# Patient Record
Sex: MALE
[De-identification: names, ages, dates, MRNs, and addresses within clinical notes are randomized per-mention and may not be internally consistent; named-entity substitution may affect disease eponyms.]

---

## 2021-02-19 ENCOUNTER — NURSE TRIAGE (OUTPATIENT)
Dept: OTHER | Facility: CLINIC | Age: 76
End: 2021-02-19

## 2021-02-19 NOTE — TELEPHONE ENCOUNTER
Reason for Disposition   Mild localized rash    Answer Assessment - Initial Assessment Questions  1. APPEARANCE of RASH: \"Describe the rash. \"       The largest spot looks like a blister. The rest looks more like scratch marks    2. LOCATION: \"Where is the rash located? \"     Left side of torso     3. NUMBER: \"How many spots are there? \"     Multiple spots    4. SIZE: \"How big are the spots? \" (Inches, centimeters or compare to size of a coin)         5. ONSET: \"When did the rash start? \"    started 3 days ago    6. ITCHING: \"Does the rash itch? \" If so, ask: \"How bad is the itch? \"  (Scale 1-10; or mild, moderate, severe)     Itching constantly    7. PAIN: \"Does the rash hurt? \" If so, ask: \"How bad is the pain? \"  (Scale 1-10; or mild, moderate, severe)   no pain    8. OTHER SYMPTOMS: \"Do you have any other symptoms? \" (e.g., fever)   no other symptoms    9. PREGNANCY: \"Is there any chance you are pregnant? \" \"When was your last menstrual period? \"      migue    Protocols used: RASH OR REDNESS - LOCALIZED-ADULT-[place]  Brief description of triage: Rash on left side of torso that started 3 days ago. Looks like scratch marks. 2 spots look like blisters. Is very itchy, no pain, no fever, no other symptoms. Has been using Neosporin. Triage indicates for patient to home care. Care advice provided, patient verbalizes understanding; denies any other questions or concerns; instructed to call back for any new or worsening symptoms. This triage is a result of a call to [address] [place] [address]. Please do not respond to the triage nurse through this encounter. Any subsequent communication should be directly with the patient.

## 2023-03-26 ENCOUNTER — NURSE TRIAGE (OUTPATIENT)
Dept: OTHER | Facility: CLINIC | Age: 78
End: 2023-03-26

## 2023-03-27 NOTE — TELEPHONE ENCOUNTER
Location of patient: [place]    Subjective: Caller states \"He has a catheter. Tonight the urine is leaking out around the penis rather than it going into the tube. Urine was clear earlier in the day but now is raspberry. \"     Current Symptoms: raspberry colored urine, leaking around catheter    Onset: 1 hours ago    Associated Symptoms: NA    Pain Severity: unknown    Temperature: NA    What has been tried: NA    LMP: NA Pregnant: NA    Recommended disposition: Go to ED Now    Care advice provided, patient verbalizes understanding; denies any other questions or concerns; instructed to call back for any new or worsening symptoms. Patient/caller agrees to proceed to local Emergency Department    This triage is a result of a call to [address] [place] [address]. Please do not respond to the triage nurse through this encounter. Any subsequent communication should be directly with the patient.     Reason for Disposition   Bleeding around catheter (e.g., from penis or female urethra)    Protocols used: Urinary Catheter (e.g., [facility]) Symptoms and Questions-ADULT-

## 2023-10-26 ENCOUNTER — APPOINTMENT (OUTPATIENT)
Dept: PULMONOLOGY | Facility: CLINIC | Age: 78
End: 2023-10-26
Payer: MEDICARE

## 2023-11-30 PROBLEM — R91.8 LUNG NODULES: Status: ACTIVE | Noted: 2023-11-30

## 2023-11-30 PROBLEM — H52.7 REFRACTIVE ERROR: Status: ACTIVE | Noted: 2023-11-30

## 2023-11-30 PROBLEM — B35.1 ONYCHOMYCOSIS: Status: ACTIVE | Noted: 2023-11-30

## 2023-11-30 PROBLEM — I10 HYPERTENSION: Status: ACTIVE | Noted: 2023-11-30

## 2023-11-30 PROBLEM — H25.813 MIXED TYPE AGE-RELATED CATARACT, BOTH EYES: Status: ACTIVE | Noted: 2023-11-30

## 2023-11-30 PROBLEM — G47.33 OBSTRUCTIVE SLEEP APNEA, ADULT: Status: ACTIVE | Noted: 2023-11-30

## 2023-11-30 PROBLEM — J31.0 MIXED RHINITIS: Status: ACTIVE | Noted: 2023-11-30

## 2023-11-30 PROBLEM — D64.9 ANEMIA: Status: ACTIVE | Noted: 2023-11-30

## 2023-11-30 PROBLEM — N39.0 ACUTE LOWER URINARY TRACT INFECTION: Status: ACTIVE | Noted: 2023-11-30

## 2023-11-30 PROBLEM — E11.319 DIABETIC RETINOPATHY (MULTI): Status: ACTIVE | Noted: 2023-11-30

## 2023-11-30 PROBLEM — A41.9 SEPSIS (MULTI): Status: ACTIVE | Noted: 2023-11-30

## 2023-11-30 PROBLEM — H02.839 DERMATOCHALASIS: Status: ACTIVE | Noted: 2023-11-30

## 2023-11-30 PROBLEM — J44.9 CHRONIC OBSTRUCTIVE LUNG DISEASE (MULTI): Status: ACTIVE | Noted: 2023-11-30

## 2023-11-30 PROBLEM — R31.0 FRANK HEMATURIA: Status: ACTIVE | Noted: 2023-11-30

## 2023-11-30 PROBLEM — K22.719 BARRETT'S ESOPHAGUS WITH DYSPLASIA: Status: ACTIVE | Noted: 2023-11-30

## 2023-11-30 PROBLEM — I10 BENIGN ESSENTIAL HYPERTENSION: Status: ACTIVE | Noted: 2023-11-30

## 2023-11-30 PROBLEM — M54.10 RADICULITIS OF LEG: Status: ACTIVE | Noted: 2023-11-30

## 2023-11-30 PROBLEM — R10.31 RIGHT LOWER QUADRANT ABDOMINAL PAIN: Status: ACTIVE | Noted: 2023-11-30

## 2023-11-30 PROBLEM — T17.500A MUCUS PLUGGING OF BRONCHI: Status: ACTIVE | Noted: 2023-11-30

## 2023-11-30 PROBLEM — E66.9 DIABETES MELLITUS TYPE 2 IN OBESE: Status: ACTIVE | Noted: 2023-11-30

## 2023-11-30 PROBLEM — L84 PRE-ULCERATIVE CALLUSES: Status: ACTIVE | Noted: 2023-11-30

## 2023-11-30 PROBLEM — H90.3 SENSORINEURAL HEARING LOSS, BILATERAL: Status: ACTIVE | Noted: 2023-11-30

## 2023-11-30 PROBLEM — R80.9 MICROALBUMINURIA: Status: ACTIVE | Noted: 2023-11-30

## 2023-11-30 PROBLEM — M20.41 HAMMER TOES, BILATERAL: Status: ACTIVE | Noted: 2023-11-30

## 2023-11-30 PROBLEM — H43.399 VITREOUS FLOATERS: Status: ACTIVE | Noted: 2023-11-30

## 2023-11-30 PROBLEM — H26.9 CATARACTS, BOTH EYES: Status: ACTIVE | Noted: 2023-11-30

## 2023-11-30 PROBLEM — R60.9 EDEMA: Status: ACTIVE | Noted: 2023-11-30

## 2023-11-30 PROBLEM — N40.0 BPH (BENIGN PROSTATIC HYPERPLASIA): Status: ACTIVE | Noted: 2023-11-30

## 2023-11-30 PROBLEM — H53.8 BLURRED VISION, BILATERAL: Status: ACTIVE | Noted: 2023-11-30

## 2023-11-30 PROBLEM — G47.30 SLEEP APNEA: Status: ACTIVE | Noted: 2023-11-30

## 2023-11-30 PROBLEM — E11.69 DIABETES MELLITUS TYPE 2 IN OBESE: Status: ACTIVE | Noted: 2023-11-30

## 2023-11-30 PROBLEM — L91.8 INFLAMED SKIN TAG: Status: ACTIVE | Noted: 2023-11-30

## 2023-11-30 PROBLEM — H25.9 AGE RELATED CATARACT: Status: ACTIVE | Noted: 2023-11-30

## 2023-11-30 PROBLEM — J44.9 COPD (CHRONIC OBSTRUCTIVE PULMONARY DISEASE) (MULTI): Status: ACTIVE | Noted: 2023-11-30

## 2023-11-30 PROBLEM — E11.9 DIABETES MELLITUS (MULTI): Status: ACTIVE | Noted: 2023-11-30

## 2023-11-30 PROBLEM — J30.9 ALLERGIC RHINITIS: Status: ACTIVE | Noted: 2023-11-30

## 2023-11-30 PROBLEM — N28.9 DECREASED RENAL FUNCTION: Status: ACTIVE | Noted: 2023-11-30

## 2023-11-30 PROBLEM — S39.012A STRAIN OF LUMBAR REGION: Status: ACTIVE | Noted: 2023-11-30

## 2023-11-30 PROBLEM — M79.10 MYALGIA: Status: ACTIVE | Noted: 2023-11-30

## 2023-11-30 PROBLEM — H25.13 NUCLEAR SCLEROSIS OF BOTH EYES: Status: ACTIVE | Noted: 2023-11-30

## 2023-11-30 PROBLEM — M79.675 PAIN IN TOES OF BOTH FEET: Status: ACTIVE | Noted: 2023-11-30

## 2023-11-30 PROBLEM — K43.2 INCISIONAL HERNIA: Status: ACTIVE | Noted: 2023-11-30

## 2023-11-30 PROBLEM — E10.9 CONTROLLED DIABETES MELLITUS TYPE 1 WITHOUT COMPLICATIONS (MULTI): Status: ACTIVE | Noted: 2023-11-30

## 2023-11-30 PROBLEM — H40.009 GLAUCOMA SUSPECT: Status: ACTIVE | Noted: 2023-11-30

## 2023-11-30 PROBLEM — E11.9 DM W/O COMPLICATION TYPE II (MULTI): Status: ACTIVE | Noted: 2023-11-30

## 2023-11-30 PROBLEM — M20.42 HAMMER TOES, BILATERAL: Status: ACTIVE | Noted: 2023-11-30

## 2023-11-30 PROBLEM — R31.9 HEMATURIA SYNDROME: Status: ACTIVE | Noted: 2023-11-30

## 2023-11-30 PROBLEM — N64.4 BREAST PAIN, LEFT: Status: ACTIVE | Noted: 2023-11-30

## 2023-11-30 PROBLEM — M79.674 PAIN IN TOES OF BOTH FEET: Status: ACTIVE | Noted: 2023-11-30

## 2023-11-30 PROBLEM — Z97.3 WEARS EYEGLASSES: Status: ACTIVE | Noted: 2023-11-30

## 2023-11-30 PROBLEM — E78.2 MIXED HYPERLIPIDEMIA: Status: ACTIVE | Noted: 2023-11-30

## 2023-11-30 PROBLEM — S61.219A LACERATION OF FINGER: Status: ACTIVE | Noted: 2023-11-30

## 2023-11-30 PROBLEM — H02.9 EYELID LESION, BENIGN: Status: ACTIVE | Noted: 2023-11-30

## 2023-11-30 PROBLEM — R06.02 EXERTIONAL SHORTNESS OF BREATH: Status: ACTIVE | Noted: 2023-11-30

## 2023-11-30 PROBLEM — H61.23 BILATERAL IMPACTED CERUMEN: Status: ACTIVE | Noted: 2023-11-30

## 2023-11-30 PROBLEM — H26.9 CATARACT: Status: ACTIVE | Noted: 2023-11-30

## 2023-11-30 RX ORDER — LOSARTAN POTASSIUM AND HYDROCHLOROTHIAZIDE 25; 100 MG/1; MG/1
TABLET ORAL EVERY 24 HOURS
COMMUNITY
Start: 2014-02-28 | End: 2023-12-14 | Stop reason: ALTCHOICE

## 2023-11-30 RX ORDER — FLUTICASONE PROPIONATE 50 MCG
2 SPRAY, SUSPENSION (ML) NASAL DAILY
COMMUNITY
Start: 2022-10-20

## 2023-11-30 RX ORDER — INSULIN GLARGINE 100 [IU]/ML
INJECTION, SOLUTION SUBCUTANEOUS
COMMUNITY

## 2023-11-30 RX ORDER — ALBUTEROL SULFATE 90 UG/1
AEROSOL, METERED RESPIRATORY (INHALATION)
COMMUNITY
Start: 2022-10-20 | End: 2024-02-22 | Stop reason: SDUPTHER

## 2023-11-30 RX ORDER — LIRAGLUTIDE 6 MG/ML
INJECTION SUBCUTANEOUS EVERY 24 HOURS
COMMUNITY
Start: 2014-02-28

## 2023-11-30 RX ORDER — PRAVASTATIN SODIUM 20 MG/1
TABLET ORAL EVERY 24 HOURS
COMMUNITY

## 2023-11-30 RX ORDER — PIOGLITAZONEHYDROCHLORIDE 45 MG/1
TABLET ORAL EVERY 24 HOURS
COMMUNITY

## 2023-12-14 ENCOUNTER — OFFICE VISIT (OUTPATIENT)
Dept: PULMONOLOGY | Facility: CLINIC | Age: 78
End: 2023-12-14
Payer: MEDICARE

## 2023-12-14 VITALS
BODY MASS INDEX: 42 KG/M2 | DIASTOLIC BLOOD PRESSURE: 76 MMHG | OXYGEN SATURATION: 95 % | SYSTOLIC BLOOD PRESSURE: 134 MMHG | WEIGHT: 276.2 LBS | HEART RATE: 68 BPM

## 2023-12-14 DIAGNOSIS — J43.2 CENTRILOBULAR EMPHYSEMA (MULTI): ICD-10-CM

## 2023-12-14 DIAGNOSIS — G47.33 OBSTRUCTIVE SLEEP APNEA, ADULT: Primary | ICD-10-CM

## 2023-12-14 DIAGNOSIS — R91.8 LUNG NODULES: ICD-10-CM

## 2023-12-14 DIAGNOSIS — T17.500A MUCUS PLUGGING OF BRONCHI: ICD-10-CM

## 2023-12-14 PROCEDURE — 1159F MED LIST DOCD IN RCRD: CPT | Performed by: NURSE PRACTITIONER

## 2023-12-14 PROCEDURE — 1036F TOBACCO NON-USER: CPT | Performed by: NURSE PRACTITIONER

## 2023-12-14 PROCEDURE — 1160F RVW MEDS BY RX/DR IN RCRD: CPT | Performed by: NURSE PRACTITIONER

## 2023-12-14 PROCEDURE — 3075F SYST BP GE 130 - 139MM HG: CPT | Performed by: NURSE PRACTITIONER

## 2023-12-14 PROCEDURE — 99214 OFFICE O/P EST MOD 30 MIN: CPT | Performed by: NURSE PRACTITIONER

## 2023-12-14 PROCEDURE — 3078F DIAST BP <80 MM HG: CPT | Performed by: NURSE PRACTITIONER

## 2023-12-14 NOTE — PATIENT INSTRUCTIONS
Mr. Amaya it was a pleasure seeing you in the office today. We discussed the following:      - Continue your proair as needed, and Spiriva handihaler every day    -You can use the Wixela in the morning and at night and your Spiriva during the day   - Great job with your CPAP, continue the good work does try and sleep little longer   - Please continue to stay active     Follow-up in 2 months with C Pap machine, mask and supplies.

## 2023-12-14 NOTE — PROGRESS NOTES
Subjective   Patient ID: sIidoro Amaya is a 78 y.o. male who presents for No chief complaint on file..  HPI  11/19/2019: 74-year-old  man with severe obstructive sleep apnea (AHI 53.9, 12/8/15) on C Pap 14 with DreamWear FFM M/M, moderate COPD, and multiple calcified mucous plugs presents for routine follow-up. The patient has been using see Pap 5-6 hours every night. No snoring on C Pap. Feels rested with sleep and denies excessive daytime sleepiness with an [place] sleepiness score of 8/24. He has been compliant with C Pap based on download from his machine. However there is and excessive mask leak recorded at 95 L/m. I examined his mask and noted that it would leak when he lies on his side despite tightening the straps. Short of breath only when walking fast. Occasional cough. No wheezing. Not always compliant with Spiriva, only using it 3-4 times a week. Rarely takes Pro Air. No hospitalizations or ER visits for respiratory problems in one year.     08/20/2020: Patient is here today to establish care. He is doing well, uses his Spiriva daily. Rarely uses his rescue inhaler. Really only experiences shortness of breath in the heat and humidity. He does get his Brie via from [employer]. He is 100% compliant with his CPAP using it 6 hours a night AHI is 2.6. He feels rested with sleep. He does get supplies regularly from [facility]. He has had s  ome billing issues with [employer]. His wife is working out. No ER visits or hospitalizations for respiratory problems.    03/04/2021: He is here today for follow-up. Patient is doing great with his CPAP machine he uses it every night for 6 hours AHI is 4. He feels rested with sleep. He gets supplies regularly. He uses his Spiriva every day he still gets this from [employer]. He does have a cough with some loose congestion although when it is productive it is clear he denies fevers, chills. I suggested he try some Mucinex to help bring the cough up and thin out the mucus. He does use  his Acapella device daily. He has a history of mucus plugging.    09/09/2021: He is here today for follow-up. He has been feeling well he uses his Spiriva daily. Has not needed his rescue medication. Had a little trouble breathing when it was hot and humid but now that it is cooler he is doing much better. He does use his CPAP every night for about 6 hours. He gets supplies regularly. Feels rested with sleep. He cannot sleep without his CPAP. I encouraged him to stay active.    04/07/2022: He is here today for follow-up. He is doing well. He does have a little bit of a morning cough which sounds like postnasal drip. We talked about using some Claritin or Mucinex or even a nasal spray if he prefers. He does use his Spiriva although not every day. I gave him some samples of the Spiriva Respimat. He does get the Spiriva which is a capsule through [employer]. Has not needed his rescue inhaler at all. Uses his CPAP every night for 5 hours AHI is 3. Get supplies regularly. Feels rested with sleep.    10/20/22 he is here today for follow-up. He has had a postnasal drip congested cough for the last week or 2 since the weather changed. Encouraged him to use his Claritin his guaifenesin and I will add some Flonase. Currently he is not using any inhalers. [facility] is out of Spiriva so he has not been able to get it. I did offer to send him in something else but he wants to wait. And he needs his ProAir refilled. He still uses his Acapella device and I encouraged him to continue to do that. He uses his CPAP every night for 5 hours his AHI is 2.3. He feels rested with sleep. He says 5 hours is his average every once while he will sleep 6.    04/20/23 he is here today for follow-up. He is doing well with his Spiriva. He gets it from [employer]. He has the HandiHaler. Uses his albuterol as needed. He did have bronchitis a couple weeks ago he was treated with doxycycline. He is using his CPAP every night for about 4 hours. He says he does  not always sleep that long because he has had a lot of medical issues over the last couple of months. I encouraged him to sleep longer or use his CPAP if he naps during the day 10 minutes spent preparing patient's chart. 20 minutes spent with patient answering questions and determining care. 5 minutes spent charting and ordering tests and medications     12/14/23 he is here today for follow-up.  He continues to use his Spiriva from [facility].  His PCP added Wixela which I explained to him today he can use the Wixela with the Spiriva and it makes triple therapy.  He is going to try it and see how it is working for him.  He is also never used his rescue inhaler he really felt that you just used it when it was a dire emergency.  We discussed utilizing his inhaler for shortness of breath coughing and wheezing as well as pretreating for something that he knows is going to cause some shortness of breath such as walking upstairs.  He wears the his CPAP machine every night.  He is averages 3.6 hours we talked today about how he needs to utilize it 70% of the time for 4 hours or greater.  He is going to work on this and come back in 2 months  Review of Systems   All other systems reviewed and are negative.      Objective   Physical Exam  Vitals and nursing note reviewed.   Constitutional:       Appearance: Normal appearance. He is normal weight.   HENT:      Head: Normocephalic.      Nose: Nose normal.   Eyes:      Pupils: Pupils are equal, round, and reactive to light.   Cardiovascular:      Rate and Rhythm: Normal rate.   Pulmonary:      Effort: Pulmonary effort is normal.      Breath sounds: Rhonchi present.   Neurological:      General: No focal deficit present.      Mental Status: He is alert and oriented to person, place, and time. Mental status is at baseline.   Psychiatric:         Mood and Affect: Mood normal.         Behavior: Behavior normal.         Thought Content: Thought content normal.         Assessment/Plan      # . Severe obstructive sleep apnea with good compliance and good control of sleep symptoms    - Continue with C Pap 13 and you ResMed air fit F20 medium fullface mask with headgear for at least 4 hours every night with sleep.    - Great job with your CPAP, he uses it every night for 6 hours and gets supplies regularly   04/20/23 he is using his CPAP for about 4 hours a night he said he has had a lot of medical problems over the last couple of months and sometimes he just does not sleep well  12/14/23 he is using his machine every night. Will increase sleep usage by sleeping longer     #. Moderate COPD from secondhand smoke exposure, controlled but not always compliant with Spiriva (3-4 days)   - Advised to take Spiriva regularly one puff daily. Continue with Pro Air 4 times daily followed by an Acapella device to help expectorate mucous plugs. Pro Air may also be taken as needed for shortness of breath   - having some coughing productive of clear sputum, suggested he add mucinex daily    -He is doing well with Spiriva, he needs refills to [facility]   - [facility] has been out of Spiriva he does not have a maintenance inhaler and he is out of his ProAir. I will send in ProAir for him. I did offer him a substitute for Spiriva but he wants to wait  .04/20/23 he is using his Spiriva from [facility]. Uses his Ventolin as needed. He did have bronchitis a couple weeks ago but is feeling better  12/14/23 he continues spiriva and pcp added wixela. Will try rescue inhaler since he has been unsure as when to use this    # . Multiple calcified mucous plugs most likely due to retained protein rich mucoid secretions in the airways, mobilizing secretions   - Advised to take Pro Air 4 times daily and to use an Acapella device after Pro Air to help expectorate mucous plugs.  12/14/23 he continues to use his Acapella device regularly    #. Morbid obesity, contributing to sleep apnea.   - Encouraged weight loss with diet and exercise which  will help in the long term treatment of obstructive sleep apnea.    Mr. Amaya it was a pleasure seeing you in the office today. We discussed the following:      - Continue your proair as needed, and Spiriva handihaler every day    -You can use the Wixela in the morning and at night and your Spiriva during the day   - Great job with your CPAP, continue the good work does try and sleep little longer   - Please continue to stay active     Follow-up in 2 months with C Pap machine, mask and supplies.       JUNI Zuñiga-CNP 12/14/23 12:42 PM

## 2024-02-15 ENCOUNTER — APPOINTMENT (OUTPATIENT)
Dept: PULMONOLOGY | Facility: CLINIC | Age: 79
End: 2024-02-15
Payer: MEDICARE

## 2024-02-22 ENCOUNTER — OFFICE VISIT (OUTPATIENT)
Dept: PULMONOLOGY | Facility: CLINIC | Age: 79
End: 2024-02-22
Payer: MEDICARE

## 2024-02-22 VITALS
BODY MASS INDEX: 42.21 KG/M2 | OXYGEN SATURATION: 91 % | SYSTOLIC BLOOD PRESSURE: 122 MMHG | WEIGHT: 277.6 LBS | HEART RATE: 95 BPM | DIASTOLIC BLOOD PRESSURE: 68 MMHG

## 2024-02-22 DIAGNOSIS — R91.8 LUNG NODULES: ICD-10-CM

## 2024-02-22 DIAGNOSIS — J43.2 CENTRILOBULAR EMPHYSEMA (MULTI): Primary | ICD-10-CM

## 2024-02-22 DIAGNOSIS — G47.33 OBSTRUCTIVE SLEEP APNEA, ADULT: ICD-10-CM

## 2024-02-22 PROCEDURE — 99214 OFFICE O/P EST MOD 30 MIN: CPT | Performed by: NURSE PRACTITIONER

## 2024-02-22 PROCEDURE — 3078F DIAST BP <80 MM HG: CPT | Performed by: NURSE PRACTITIONER

## 2024-02-22 PROCEDURE — 1036F TOBACCO NON-USER: CPT | Performed by: NURSE PRACTITIONER

## 2024-02-22 PROCEDURE — 3074F SYST BP LT 130 MM HG: CPT | Performed by: NURSE PRACTITIONER

## 2024-02-22 PROCEDURE — 1159F MED LIST DOCD IN RCRD: CPT | Performed by: NURSE PRACTITIONER

## 2024-02-22 PROCEDURE — 1160F RVW MEDS BY RX/DR IN RCRD: CPT | Performed by: NURSE PRACTITIONER

## 2024-02-22 RX ORDER — OMEPRAZOLE 40 MG/1
40 CAPSULE, DELAYED RELEASE ORAL
COMMUNITY

## 2024-02-22 RX ORDER — DULAGLUTIDE 0.75 MG/.5ML
0.75 INJECTION, SOLUTION SUBCUTANEOUS
COMMUNITY

## 2024-02-22 RX ORDER — TIOTROPIUM BROMIDE 18 UG/1
1 CAPSULE ORAL; RESPIRATORY (INHALATION)
Qty: 90 CAPSULE | Refills: 2 | Status: SHIPPED | OUTPATIENT
Start: 2024-02-22

## 2024-02-22 RX ORDER — ALBUTEROL SULFATE 90 UG/1
2 AEROSOL, METERED RESPIRATORY (INHALATION) EVERY 4 HOURS PRN
Qty: 18 G | Refills: 11 | Status: SHIPPED | OUTPATIENT
Start: 2024-02-22

## 2024-02-22 RX ORDER — TIOTROPIUM BROMIDE 18 UG/1
1 CAPSULE ORAL; RESPIRATORY (INHALATION)
COMMUNITY
End: 2024-02-22 | Stop reason: SDUPTHER

## 2024-02-22 ASSESSMENT — ENCOUNTER SYMPTOMS: COUGH: 1

## 2024-02-22 NOTE — PROGRESS NOTES
Subjective   Patient ID: Isidoro Amaya is a 79 y.o. male who presents for No chief complaint on file..  HPI  11/19/2019: 74-year-old  man with severe obstructive sleep apnea (AHI 53.9, 12/8/15) on C Pap 14 with DreamWear FFM M/M, moderate COPD, and multiple calcified mucous plugs presents for routine follow-up. The patient has been using see Pap 5-6 hours every night. No snoring on C Pap. Feels rested with sleep and denies excessive daytime sleepiness with an [place] sleepiness score of 8/24. He has been compliant with C Pap based on download from his machine. However there is and excessive mask leak recorded at 95 L/m. I examined his mask and noted that it would leak when he lies on his side despite tightening the straps. Short of breath only when walking fast. Occasional cough. No wheezing. Not always compliant with Spiriva, only using it 3-4 times a week. Rarely takes Pro Air. No hospitalizations or ER visits for respiratory problems in one year.     08/20/2020: Patient is here today to establish care. He is doing well, uses his Spiriva daily. Rarely uses his rescue inhaler. Really only experiences shortness of breath in the heat and humidity. He does get his Brie via from [employer]. He is 100% compliant with his CPAP using it 6 hours a night AHI is 2.6. He feels rested with sleep. He does get supplies regularly from [facility]. He has had s  ome billing issues with [employer]. His wife is working out. No ER visits or hospitalizations for respiratory problems.    03/04/2021: He is here today for follow-up. Patient is doing great with his CPAP machine he uses it every night for 6 hours AHI is 4. He feels rested with sleep. He gets supplies regularly. He uses his Spiriva every day he still gets this from [facility] [employer]. He does have a cough with some loose congestion although when it is productive it is clear he denies fevers, chills. I suggested he try some Mucinex to help bring the cough up and thin out the mucus. He does use  his Acapella device daily. He has a history of mucus plugging.    09/09/2021: He is here today for follow-up. He has been feeling well he uses his Spiriva daily. Has not needed his rescue medication. Had a little trouble breathing when it was hot and humid but now that it is cooler he is doing much better. He does use his CPAP every night for about 6 hours. He gets supplies regularly. Feels rested with sleep. He cannot sleep without his CPAP. I encouraged him to stay active.    04/07/2022: He is here today for follow-up. He is doing well. He does have a little bit of a morning cough which sounds like postnasal drip. We talked about using some Claritin or Mucinex or even a nasal spray if he prefers. He does use his Spiriva although not every day. I gave him some samples of the Spiriva Respimat. He does get the Spiriva which is a capsule through [facility]. Has not needed his rescue inhaler at all. Uses his CPAP every night for 5 hours AHI is 3. Get supplies regularly. Feels rested with sleep.    10/20/22 he is here today for follow-up. He has had a postnasal drip congested cough for the last week or 2 since the weather changed. Encouraged him to use his Claritin his guaifenesin and I will add some Flonase. Currently he is not using any inhalers. [facility] is out of Spiriva so he has not been able to get it. I did offer to send him in something else but he wants to wait. And he needs his ProAir refilled. He still uses his Acapella device and I encouraged him to continue to do that. He uses his CPAP every night for 5 hours his AHI is 2.3. He feels rested with sleep. He says 5 hours is his average every once while he will sleep 6.    04/20/23 he is here today for follow-up. He is doing well with his Spiriva. He gets it from [facility]. He has the HandiHaler. Uses his albuterol as needed. He did have bronchitis a couple weeks ago he was treated with doxycycline. He is using his CPAP every night for about 4 hours. He says he does  not always sleep that long because he has had a lot of medical issues over the last couple of months. I encouraged him to sleep longer or use his CPAP if he naps during the day 10 minutes spent preparing patient's chart. 20 minutes spent with patient answering questions and determining care. 5 minutes spent charting and ordering tests and medications     12/14/23 he is here today for follow-up.  He continues to use his Spiriva from [facility].  His PCP added Wixela which I explained to him today he can use the Wixela with the Spiriva and it makes triple therapy.  He is going to try it and see how it is working for him.  He is also never used his rescue inhaler he really felt that you just used it when it was a dire emergency.  We discussed utilizing his inhaler for shortness of breath coughing and wheezing as well as pretreating for something that he knows is going to cause some shortness of breath such as walking upstairs.  He wears the his CPAP machine every night.  He is averages 3.6 hours we talked today about how he needs to utilize it 70% of the time for 4 hours or greater.  He is going to work on this and come back in 2 months    2/22/24 he is here today for follow-up.  He is using his Spiriva and tells me he just started the Wixela.  He needs a Spiriva since he cannot.  Uses albuterol as needed.  He has a cough but it sounds postnasal drip.  His lungs are clear.  He is complaining today of a headache.  He tells me his blood sugars have been high since they changed his medication.  I recommend that he follow-up with his PCP and if the headache should worsen I suggested he go to the ED.  He does use his CPAP he puts it on every night but he only sleeps for about 3.5 hours.  We really talked today about utilizing the CPAP throughout the entire night.  Review of Systems   Respiratory:  Positive for cough.    All other systems reviewed and are negative.      Objective   Physical Exam  Vitals and nursing note  reviewed.   Constitutional:       Appearance: Normal appearance. He is normal weight.   HENT:      Head: Normocephalic.      Nose: Nose normal.   Eyes:      Pupils: Pupils are equal, round, and reactive to light.   Cardiovascular:      Rate and Rhythm: Normal rate.   Pulmonary:      Effort: Pulmonary effort is normal.      Breath sounds: Normal breath sounds.   Neurological:      General: No focal deficit present.      Mental Status: He is alert and oriented to person, place, and time. Mental status is at baseline.   Psychiatric:         Mood and Affect: Mood normal.         Behavior: Behavior normal.         Thought Content: Thought content normal.         Assessment/Plan     # . Severe obstructive sleep apnea with good compliance and good control of sleep symptoms    - Continue with C Pap 13 and you ResMed air fit F20 medium fullface mask with headgear for at least 4 hours every night with sleep.    - Great job with your CPAP, he uses it every night for 6 hours and gets supplies regularly   04/20/23 he is using his CPAP for about 4 hours a night he said he has had a lot of medical problems over the last couple of months and sometimes he just does not sleep well  12/14/23 he is using his machine every night. Will increase sleep usage by sleeping longer   2/22/24  He uses his CPAP every ngiht but only for about 3.5 hours.     #. Moderate COPD from secondhand smoke exposure, controlled but not always compliant with Spiriva (3-4 days)   - Advised to take Spiriva regularly one puff daily. Continue with Pro Air 4 times daily followed by an Acapella device to help expectorate mucous plugs. Pro Air may also be taken as needed for shortness of breath   - having some coughing productive of clear sputum, suggested he add mucinex daily    -He is doing well with Spiriva, he needs refills to [facility]   - [facility] has been out of Spiriva he does not have a maintenance inhaler and he is out of his ProAir. I will send in ProAir for  him. I did offer him a substitute for Spiriva but he wants to wait  .04/20/23 he is using his Spiriva from [facility]. Uses his Ventolin as needed. He did have bronchitis a couple weeks ago but is feeling better  12/14/23 he continues spiriva and pcp added wixela. Will try rescue inhaler since he has been unsure as when to use this  2/22/24 He continues on sprivia but it out, needs script sent to [facility] and uses wixela. Uses proair     # . Multiple calcified mucous plugs most likely due to retained protein rich mucoid secretions in the airways, mobilizing secretions   - Advised to take Pro Air 4 times daily and to use an Acapella device after Pro Air to help expectorate mucous plugs.  12/14/23 he continues to use his Acapella device regularly    #. Morbid obesity, contributing to sleep apnea.   - Encouraged weight loss with diet and exercise which will help in the long term treatment of obstructive sleep apnea.    # lung nodules   Ct 12/28/23 calcified nodules,  and multiple non calcified nodules including a 5mm subpleural nodule. Bronchiectatic changes. Never a smoker    Mr. Amaya it was a pleasure seeing you in the office today. We discussed the following:      - Continue your proair as needed, and Spiriva handihaler every day    -You can use the Wixela in the morning and at night and your Spiriva during the day   - Great job with your CPAP, continue the good work does try and sleep little longer   - Please continue to stay active     Follow-up in 6 months with C Pap machine, mask and supplies.       JUNI Zuñiga-TATA 02/22/24 10:51 AM

## 2024-02-22 NOTE — PATIENT INSTRUCTIONS
Mr. Amaya it was a pleasure seeing you in the office today. We discussed the following:      - Continue your proair as needed, and Spiriva handihaler every day    -You can use the Wixela in the morning and at night and your Spiriva during the day   - Great job with your CPAP, continue the good work does try and sleep little longer   - Please continue to stay active     Follow-up in 6 months with C Pap machine, mask and supplies.

## 2024-02-28 ENCOUNTER — TELEPHONE (OUTPATIENT)
Dept: PULMONOLOGY | Facility: CLINIC | Age: 79
End: 2024-02-28
Payer: MEDICARE

## 2024-02-28 NOTE — TELEPHONE ENCOUNTER
Patient called questioning why he is getting phone calls to scheduled pft/6min. Nothing in your note from his visit on 2/22 mentions any testing nor did he say he was told to do any testing but I do see where the order were put in on 2/22?

## 2024-02-28 NOTE — TELEPHONE ENCOUNTER
The last time he did a pft was in 2015. I would like him to one if possible if not he can wait until next visit

## 2024-03-01 ENCOUNTER — TELEPHONE (OUTPATIENT)
Dept: PULMONOLOGY | Facility: HOSPITAL | Age: 79
End: 2024-03-01
Payer: MEDICARE

## 2024-03-12 ENCOUNTER — HOSPITAL ENCOUNTER (OUTPATIENT)
Dept: RESPIRATORY THERAPY | Facility: HOSPITAL | Age: 79
Setting detail: THERAPIES SERIES
Discharge: HOME | End: 2024-03-12
Payer: MEDICARE

## 2024-03-12 DIAGNOSIS — J43.2 CENTRILOBULAR EMPHYSEMA (MULTI): ICD-10-CM

## 2024-03-12 PROCEDURE — 94618 PULMONARY STRESS TESTING: CPT

## 2024-08-28 ENCOUNTER — APPOINTMENT (OUTPATIENT)
Dept: PULMONOLOGY | Facility: CLINIC | Age: 79
End: 2024-08-28
Payer: MEDICARE

## 2024-09-05 ENCOUNTER — APPOINTMENT (OUTPATIENT)
Dept: PULMONOLOGY | Facility: CLINIC | Age: 79
End: 2024-09-05
Payer: MEDICARE

## 2024-09-05 VITALS
WEIGHT: 277.8 LBS | HEART RATE: 70 BPM | DIASTOLIC BLOOD PRESSURE: 77 MMHG | BODY MASS INDEX: 42.24 KG/M2 | SYSTOLIC BLOOD PRESSURE: 133 MMHG | OXYGEN SATURATION: 93 %

## 2024-09-05 DIAGNOSIS — G47.33 OBSTRUCTIVE SLEEP APNEA, ADULT: ICD-10-CM

## 2024-09-05 DIAGNOSIS — J43.2 CENTRILOBULAR EMPHYSEMA (MULTI): ICD-10-CM

## 2024-09-05 DIAGNOSIS — J30.9 ALLERGIC RHINITIS, UNSPECIFIED SEASONALITY, UNSPECIFIED TRIGGER: Primary | ICD-10-CM

## 2024-09-05 PROCEDURE — 99215 OFFICE O/P EST HI 40 MIN: CPT | Performed by: NURSE PRACTITIONER

## 2024-09-05 PROCEDURE — 1159F MED LIST DOCD IN RCRD: CPT | Performed by: NURSE PRACTITIONER

## 2024-09-05 PROCEDURE — 3075F SYST BP GE 130 - 139MM HG: CPT | Performed by: NURSE PRACTITIONER

## 2024-09-05 PROCEDURE — 1036F TOBACCO NON-USER: CPT | Performed by: NURSE PRACTITIONER

## 2024-09-05 PROCEDURE — 1160F RVW MEDS BY RX/DR IN RCRD: CPT | Performed by: NURSE PRACTITIONER

## 2024-09-05 PROCEDURE — 3078F DIAST BP <80 MM HG: CPT | Performed by: NURSE PRACTITIONER

## 2024-09-05 RX ORDER — SEMAGLUTIDE 2.68 MG/ML
INJECTION, SOLUTION SUBCUTANEOUS
COMMUNITY

## 2024-09-05 RX ORDER — TIOTROPIUM BROMIDE 18 UG/1
1 CAPSULE ORAL; RESPIRATORY (INHALATION)
Qty: 90 CAPSULE | Refills: 3 | Status: SHIPPED | OUTPATIENT
Start: 2024-09-05

## 2024-09-05 ASSESSMENT — PATIENT HEALTH QUESTIONNAIRE - PHQ9
1. LITTLE INTEREST OR PLEASURE IN DOING THINGS: NOT AT ALL
SUM OF ALL RESPONSES TO PHQ9 QUESTIONS 1 AND 2: 0
2. FEELING DOWN, DEPRESSED OR HOPELESS: NOT AT ALL

## 2024-09-05 ASSESSMENT — ENCOUNTER SYMPTOMS: COUGH: 1

## 2024-09-05 NOTE — PROGRESS NOTES
Subjective   Patient ID: Isidoro Amaya is a 79 y.o. male who presents for Follow-up (6 month fuv cpap hcs).  HPI  11/19/2019: 74-year-old  man with severe obstructive sleep apnea (AHI 53.9, 12/8/15) on C Pap 14 with [employer] FFM M/M, moderate COPD, and multiple calcified mucous plugs presents for routine follow-up. The patient has been using see Pap 5-6 hours every night. No snoring on C Pap. Feels rested with sleep and denies excessive daytime sleepiness with an Allyn sleepiness score of 8/24. He has been compliant with C Pap based on download from his machine. However there is and excessive mask leak recorded at 95 L/m. I examined his mask and noted that it would leak when he lies on his side despite tightening the straps. Short of breath only when walking fast. Occasional cough. No wheezing. Not always compliant with Spiriva, only using it 3-4 times a week. Rarely takes Pro Air. No hospitalizations or ER visits for respiratory problems in one year.     08/20/2020: Patient is here today to establish care. He is doing well, uses his Spiriva daily. Rarely uses his rescue inhaler. Really only experiences shortness of breath in the heat and humidity. He does get his Brie via from [facility]. He is 100% compliant with his CPAP using it 6 hours a night AHI is 2.6. He feels rested with sleep. He does get supplies regularly from [facility]. He has had s  ome billing issues with [facility]. His wife is working out. No ER visits or hospitalizations for respiratory problems.    03/04/2021: He is here today for follow-up. Patient is doing great with his CPAP machine he uses it every night for 6 hours AHI is 4. He feels rested with sleep. He gets supplies regularly. He uses his Spiriva every day he still gets this from [facility]. He does have a cough with some loose congestion although when it is productive it is clear he denies fevers, chills. I suggested he try some Mucinex to help bring the cough up and thin out the mucus. He  does use his Acapella device daily. He has a history of mucus plugging.    09/09/2021: He is here today for follow-up. He has been feeling well he uses his Spiriva daily. Has not needed his rescue medication. Had a little trouble breathing when it was hot and humid but now that it is cooler he is doing much better. He does use his CPAP every night for about 6 hours. He gets supplies regularly. Feels rested with sleep. He cannot sleep without his CPAP. I encouraged him to stay active.    04/07/2022: He is here today for follow-up. He is doing well. He does have a little bit of a morning cough which sounds like postnasal drip. We talked about using some Claritin or Mucinex or even a nasal spray if he prefers. He does use his Spiriva although not every day. I gave him some samples of the Spiriva Respimat. He does get the Spiriva which is a capsule through [facility]. Has not needed his rescue inhaler at all. Uses his CPAP every night for 5 hours AHI is 3. Get supplies regularly. Feels rested with sleep.    10/20/22 he is here today for follow-up. He has had a postnasal drip congested cough for the last week or 2 since the weather changed. Encouraged him to use his Claritin his guaifenesin and I will add some Flonase. Currently he is not using any inhalers. [facility] is out of Spiriva so he has not been able to get it. I did offer to send him in something else but he wants to wait. And he needs his ProAir refilled. He still uses his Acapella device and I encouraged him to continue to do that. He uses his CPAP every night for 5 hours his AHI is 2.3. He feels rested with sleep. He says 5 hours is his average every once while he will sleep 6.    04/20/23 he is here today for follow-up. He is doing well with his Spiriva. He gets it from [facility]. He has the HandiHaler. Uses his albuterol as needed. He did have bronchitis a couple weeks ago he was treated with doxycycline. He is using his CPAP every night for about 4 hours. He says  he does not always sleep that long because he has had a lot of medical issues over the last couple of months. I encouraged him to sleep longer or use his CPAP if he naps during the day 10 minutes spent preparing patient's chart. 20 minutes spent with patient answering questions and determining care. 5 minutes spent charting and ordering tests and medications     12/14/23 he is here today for follow-up.  He continues to use his Spiriva from [facility].  His PCP added Wixela which I explained to him today he can use the Wixela with the Spiriva and it makes triple therapy.  He is going to try it and see how it is working for him.  He is also never used his rescue inhaler he really felt that you just used it when it was a dire emergency.  We discussed utilizing his inhaler for shortness of breath coughing and wheezing as well as pretreating for something that he knows is going to cause some shortness of breath such as walking upstairs.  He wears the his CPAP machine every night.  He is averages 3.6 hours we talked today about how he needs to utilize it 70% of the time for 4 hours or greater.  He is going to work on this and come back in 2 months    2/22/24 he is here today for follow-up.  He is using his Spiriva and tells me he just started the Wixela.  He needs a Spiriva since he cannot.  Uses albuterol as needed.  He has a cough but it sounds postnasal drip.  His lungs are clear.  He is complaining today of a headache.  He tells me his blood sugars have been high since they changed his medication.  I recommend that he follow-up with his PCP and if the headache should worsen I suggested he go to the ED.  He does use his CPAP he puts it on every night but he only sleeps for about 3.5 hours.  We really talked today about utilizing the CPAP throughout the entire night.    09/05/24 he is here today for follow-up.  He is doing well.  He has developed a cough and is a wet sounding cough.  He has an appointment with Dr. Rosenberg but  could not get in until December.  It sounds like postnasal drip he is also having some congestion in his sinuses.  I recommend that he start a nasal spray and use Claritin over the next few weeks to see if it resolves.    Review of Systems   Respiratory:  Positive for cough.    All other systems reviewed and are negative.      Objective   Physical Exam  Vitals and nursing note reviewed.   Constitutional:       Appearance: Normal appearance. He is normal weight.   HENT:      Head: Normocephalic.      Nose: Nose normal.   Eyes:      Pupils: Pupils are equal, round, and reactive to light.   Cardiovascular:      Rate and Rhythm: Normal rate.   Pulmonary:      Effort: Pulmonary effort is normal.      Breath sounds: Normal breath sounds.   Neurological:      General: No focal deficit present.      Mental Status: He is alert and oriented to person, place, and time. Mental status is at baseline.   Psychiatric:         Mood and Affect: Mood normal.         Behavior: Behavior normal.         Thought Content: Thought content normal.         Assessment/Plan     # . Severe obstructive sleep apnea with good compliance and good control of sleep symptoms    - Continue with C Pap 13 and you ResMed air fit F20 medium fullface mask with headgear for at least 4 hours every night with sleep.    - Great job with your CPAP, he uses it every night for 6 hours and gets supplies regularly   04/20/23 he is using his CPAP for about 4 hours a night he said he has had a lot of medical problems over the last couple of months and sometimes he just does not sleep well  12/14/23 he is using his machine every night. Will increase sleep usage by sleeping longer   2/22/24  He uses his CPAP every ngiht but only for about 3.5 hours.   09/05/24 His cpap is set at 13 he uses it every night for 4.5 hours Ahi is 1.5. he gets supplies and feels mostly rested    #. Moderate COPD from secondhand smoke exposure, controlled but not always compliant with Spiriva  (3-4 days)   - Advised to take Spiriva regularly one puff daily. Continue with Pro Air 4 times daily followed by an Acapella device to help expectorate mucous plugs. Pro Air may also be taken as needed for shortness of breath   - having some coughing productive of clear sputum, suggested he add mucinex daily    -He is doing well with Spiriva, he needs refills to [place] [facility]   - [facility] has been out of Spiriva he does not have a maintenance inhaler and he is out of his ProAir. I will send in ProAir for him. I did offer him a substitute for Spiriva but he wants to wait  .04/20/23 he is using his Spiriva from [facility]. Uses his Ventolin as needed. He did have bronchitis a couple weeks ago but is feeling better  12/14/23 he continues spiriva and pcp added wixela. Will try rescue inhaler since he has been unsure as when to use this  2/22/24 He continues on sprivia but it out, needs script sent to [facility]. Uses proair   09/05/24 He continues on Spiriva and uses his albuterol as needed     # . Multiple calcified mucous plugs most likely due to retained protein rich mucoid secretions in the airways, mobilizing secretions   - Advised to take Pro Air 4 times daily and to use an Acapella device after Pro Air to help expectorate mucous plugs.  12/14/23 he continues to use his Acapella device regularly    #. Morbid obesity, contributing to sleep apnea.   - Encouraged weight loss with diet and exercise which will help in the long term treatment of obstructive sleep apnea.    # lung nodules   Ct 12/28/23 calcified nodules,  and multiple non calcified nodules including a 5mm subpleural nodule. Bronchiectatic changes. Never a smoker    Mr. Amaya it was a pleasure seeing you in the office today. We discussed the following:      - Continue your proair as needed, and Spiriva handihaler every day    -your Spiriva during the day   - Great job with your CPAP, continue the good work does try and sleep little longer   - Please continue to stay  active     Follow-up in 6 months with C Pap machine, mask and supplies.   Please follow-up with Dr. Plata in 6 months for your breathing  Please follow-up with Dr. Sorenson in 1 year for your CPAP    DOMINGUEZ Zuñiga 09/05/24 11:31 AM

## 2024-09-05 NOTE — PATIENT INSTRUCTIONS
janet follow-up with Dr. Plata in 6 months for your breathing  Please follow-up with Dr. Sorenson in 1 year for your CPAP

## 2024-12-10 ENCOUNTER — APPOINTMENT (OUTPATIENT)
Dept: OTOLARYNGOLOGY | Facility: CLINIC | Age: 79
End: 2024-12-10
Payer: MEDICARE

## 2024-12-10 ENCOUNTER — APPOINTMENT (OUTPATIENT)
Dept: AUDIOLOGY | Facility: CLINIC | Age: 79
End: 2024-12-10
Payer: MEDICARE

## 2024-12-10 VITALS — HEIGHT: 68 IN | BODY MASS INDEX: 42.74 KG/M2 | WEIGHT: 282 LBS

## 2024-12-10 DIAGNOSIS — K21.9 GERD WITHOUT ESOPHAGITIS: Primary | ICD-10-CM

## 2024-12-10 DIAGNOSIS — H90.A21 SENSORINEURAL HEARING LOSS (SNHL) OF RIGHT EAR WITH RESTRICTED HEARING OF LEFT EAR: ICD-10-CM

## 2024-12-10 DIAGNOSIS — K22.719 BARRETT'S ESOPHAGUS WITH DYSPLASIA: ICD-10-CM

## 2024-12-10 DIAGNOSIS — H61.23 BILATERAL IMPACTED CERUMEN: ICD-10-CM

## 2024-12-10 DIAGNOSIS — H90.3 SENSORINEURAL HEARING LOSS (SNHL) OF BOTH EARS: ICD-10-CM

## 2024-12-10 DIAGNOSIS — H93.13 TINNITUS OF BOTH EARS: Primary | ICD-10-CM

## 2024-12-10 DIAGNOSIS — H90.A32 MIXED CONDUCTIVE AND SENSORINEURAL HEARING LOSS OF LEFT EAR WITH RESTRICTED HEARING OF RIGHT EAR: ICD-10-CM

## 2024-12-10 PROCEDURE — 92557 COMPREHENSIVE HEARING TEST: CPT | Performed by: AUDIOLOGIST

## 2024-12-10 PROCEDURE — 99214 OFFICE O/P EST MOD 30 MIN: CPT | Performed by: OTOLARYNGOLOGY

## 2024-12-10 PROCEDURE — 1036F TOBACCO NON-USER: CPT | Performed by: OTOLARYNGOLOGY

## 2024-12-10 PROCEDURE — 1159F MED LIST DOCD IN RCRD: CPT | Performed by: OTOLARYNGOLOGY

## 2024-12-10 PROCEDURE — 69210 REMOVE IMPACTED EAR WAX UNI: CPT | Performed by: OTOLARYNGOLOGY

## 2024-12-10 PROCEDURE — 92567 TYMPANOMETRY: CPT | Performed by: AUDIOLOGIST

## 2024-12-10 PROCEDURE — 31575 DIAGNOSTIC LARYNGOSCOPY: CPT | Performed by: OTOLARYNGOLOGY

## 2024-12-10 NOTE — PROGRESS NOTES
AUDIOLOGY ADULT AUDIOMETRIC EVALUATION    Name:  Isidoro Amaya  :  1945  Age:  79 y.o.  Date of Evaluation:  December 10, 2024    Reason for visit: Mr. Amaya is seen in the clinic today at the request of Byron Rosenberg MD n otolaryngology for an audiologic evaluation.     HISTORY  The patient has a history of bilateral hearing loss and wears binaural hearing aids that he obtained from [facility].  He is not hearing well with his hearing aids.  Bilateral aural pressure and tinnitus were reported.      EVALUATION  See scanned audiogram: “Media” > “Audiology Report”.      RESULTS  Otoscopic Evaluation:  Right Ear: clear ear canal  Left Ear: clear ear canal    Immittance Measures:  Tympanometry:  Right Ear: Reduced tympanic membrane mobility   Left Ear: Type As, reduced tympanic membrane mobility with slightly negative middle ear pressure     Acoustic Reflexes:  Ipsilateral Right Ear: Could not evaluate since an adequate seal could not be maintained    Ipsilateral Left Ear: Could not evaluate since an adequate seal could not be maintained    Contralateral Right Ear: did not evaluate  Contralateral Left Ear: did not evaluate    Distortion Product Otoacoustic Emissions (DPOAEs):  Right Ear: did not evaluate   Left Ear: did not evaluate     Audiometry:  Test Technique and Reliability:   Standard audiometry via insert earphones/supra-aural headphones. Reliability is good.    Pure tone air and bone conduction audiometry:  Right Ear: mild sloping to severe sensorineural hearing loss at 500-8000 Hz  Left Ear: mild sloping to profound mixed hearing loss at 500-8000 Hz    Speech Audiometry (Word Recognition Scores):   Right Ear: Excellent, 96% in quiet at an elevated presentation level   Left Ear: Good, 80% in quiet at an elevated presentation level     IMPRESSIONS  Results of today's audiometric evaluation revealed a sensorineural hearing loss in the right ear and a mixed hearing loss in the left ear.  No  prior audiologic evaluation is available for comparison.     RECOMMENDATIONS  - Follow up with otolaryngology today as scheduled.  - Audiologic evaluation in conjunction with otologic care, if an acute change is noted, and/or annually.  - Continued hearing aid use.  The patient will be given a copy of today's hearing evaluation to take to his provider.  - Follow-up with audiology annually for routine hearing aid maintenance, sooner if questions/problems arise.  - Follow-up with medical care team as planned.    PATIENT EDUCATION  Discussed results, impressions and recommendations with the patient. Questions were addressed and the patient was encouraged to contact our office should concerns arise.    Time for this encounter: 9:30-10:00    Hansa Gallegos M.A., CCC-A   Licensed Audiologist

## 2024-12-10 NOTE — PROGRESS NOTES
Chief Complaint   Patient presents with    Follow-up     LOV: 8/2022 THROAT CHECK, ZAPATA'S ESOPHAGUS, ON PRILOSEC       Date of Evaluation: 12/10/2024   HPI  He is having intermittent sore throats.  He is no longer taking Prilosec.  He has a history of Zapata's esophagus and has not been seen by GI in many years.  His ears feel plugged  Isidoro Amaya is a 79 y.o. male with reflux laryngitis and hoarseness, last seen in 2022. He has bilateral sensorineural hearing loss and wears bilateral hearing aids.  He has a history of cerumen impactions.  He has a history of Zapata's esophagus and has been on on Prilosec in the past.  He no longer is following with GI        Past Medical History:   Diagnosis Date    Abnormal electrocardiogram (ECG) (EKG) 02/12/2016    Abnormal EKG    Allergic rhinitis, unspecified 04/07/2022    Allergic rhinitis    Dermatochalasis of unspecified eye, unspecified eyelid 11/05/2019    Dermatochalasis    Disorder of kidney and ureter, unspecified 12/04/2013    Renal disorder    Encounter for examination of eyes and vision without abnormal findings     Diabetic eye exam    Encounter for examination of eyes and vision without abnormal findings     Diabetic eye exam    Other specified symptoms and signs involving the circulatory and respiratory systems 10/27/2015    Abnormal pulmonary finding    Personal history of other diseases of the circulatory system     History of hypertension    Personal history of other diseases of the nervous system and sense organs     History of obstructive sleep apnea    Personal history of other diseases of the respiratory system     History of chronic obstructive lung disease    Pinguecula, right eye 11/05/2019    Pinguecula of right eye      Past Surgical History:   Procedure Laterality Date    ESOPHAGOGASTRODUODENOSCOPY  02/28/2014    Diagnostic Esophagogastroduodenoscopy    OTHER SURGICAL HISTORY  08/24/2022    Cholecystectomy    OTHER SURGICAL HISTORY   "08/24/2022    Appendectomy    OTHER SURGICAL HISTORY  02/28/2014    Cystoscopy With Biopsy    OTHER SURGICAL HISTORY  02/28/2014    Capsule Endoscopy          Medications:   Current Outpatient Medications   Medication Instructions    albuterol (Ventolin HFA) 90 mcg/actuation inhaler 2 puffs, inhalation, Every 4 hours PRN    fluticasone (Flonase) 50 mcg/actuation nasal spray 2 sprays, Daily    insulin glargine (Lantus U-100 Insulin) 100 unit/mL injection Subcutaneous    liraglutide (Victoza 2-Natanael) 0.6 mg/0.1 mL (18 mg/3 mL) injection Every 24 hours    iaduymo-SQVC-lyd-valer-hops-lm 0.15--200 mg capsule 50 mg, As needed    methylcellulose oral powder 1 packet, Daily    om-3-dha-epa-fish-D3-saw-herbs 6.6 mg-100 mg- 200 mg-5 mcg capsule 200 mg, Daily    omeprazole (PRILOSEC) 40 mg, Daily before breakfast    Ozempic 2 mg/dose (8 mg/3 mL) pen injector Inject under the skin.    pioglitazone (Actos) 45 mg tablet Every 24 hours    pravastatin (Pravachol) 20 mg tablet Every 24 hours    tiotropium (SPIRIVA) 18 mcg, inhalation, Daily RT    Trulicity 0.75 mg, Once Weekly        Allergies:  No Known Allergies     Physical Exam:  Last Recorded Vitals  Height 1.727 m (5' 8\"), weight 128 kg (282 lb). , Body mass index is 42.88 kg/m².  []General appearance: Well-developed, well-nourished in no acute distress, conversant with normal voice quality    Head/face: No erythema or edema or facial tenderness, and normal facial nerve function bilaterally    External ear: Clear external auditory canals with normal pinnae  Tube status: N/A  Middle ear: Tympanic membranes intact and mobile, middle ears normal.  Tympanic membrane perforation: N/A  Mastoid bowl: N/A  Hearing: Normal conversational awareness at normal speech thresholds    Nose visualized using: Anterior rhinoscopy  Nasal dorsum: Nontraumatic midline appearance  Septum: Deviated right  Inferior turbinates: Normal, pink  Secretions: Dry    Oral cavity and oropharynx: " Normal  Teeth: Good condition  Floor of mouth: without lesions  Palate: Normal hard palate, soft palate and uvula  Oropharynx: Clear, no lesions present  Buccal mucosa: Normal without masses or lesions  Lips: Normal    Nasopharynx: Normal on endoscopy.  Larynx and hypopharynx: Flexible laryngoscopy was performed secondary to an inadequate mirror examination.  With verbal informed consent the flexible laryngoscope was passed through the nasal cavity.  The patient had a normal epiglottis, AE folds, arytenoids, false vocal cords, true vocal cords, and normal vocal cord mobility bilaterally.  No significant mucosal masses or lesions noted    Neck:  Salivary glands: Normal bilateral parotid and submandibular glands by inspection and palpation.  Non-thyroid masses: No palpable masses or significant lymphadenopathy  Trachea: Midline  Thyroid: No thyromegaly or palpable nodules  Temporomandibular joint: Nontender  Cervical range of motion: Normal    Neurologic exam: Alert and oriented x3, appropriate affect.  Cranial nerves II-XII normal bilaterally  Extraocular movement: Extraocular movement intact, normal gaze alignment          Isidoro was seen today for follow-up.  Diagnoses and all orders for this visit:  GERD without esophagitis (Primary)  Bilateral impacted cerumen  Sensorineural hearing loss (SNHL) of both ears  Shafer's esophagus with dysplasia       PLAN  He will follow-up with audiology later today.  Cerumen impactions were removed.  His head neck exam is very normal.  I recommended that he see gastroenterology for follow-up on the Shafer's esophagus and determination as to whether Prilosec needs to be used again    Byron Rosenberg MD

## 2025-03-07 ENCOUNTER — APPOINTMENT (OUTPATIENT)
Dept: PULMONOLOGY | Facility: CLINIC | Age: 80
End: 2025-03-07
Payer: MEDICARE

## 2025-03-11 ENCOUNTER — APPOINTMENT (OUTPATIENT)
Dept: PULMONOLOGY | Facility: CLINIC | Age: 80
End: 2025-03-11
Payer: MEDICARE

## 2025-03-11 VITALS
DIASTOLIC BLOOD PRESSURE: 78 MMHG | WEIGHT: 272 LBS | HEART RATE: 70 BPM | OXYGEN SATURATION: 93 % | BODY MASS INDEX: 41.36 KG/M2 | SYSTOLIC BLOOD PRESSURE: 129 MMHG

## 2025-03-11 DIAGNOSIS — J43.2 CENTRILOBULAR EMPHYSEMA (MULTI): Primary | ICD-10-CM

## 2025-03-11 DIAGNOSIS — G47.33 OBSTRUCTIVE SLEEP APNEA, ADULT: ICD-10-CM

## 2025-03-11 PROCEDURE — 3074F SYST BP LT 130 MM HG: CPT | Performed by: PEDIATRICS

## 2025-03-11 PROCEDURE — 3078F DIAST BP <80 MM HG: CPT | Performed by: PEDIATRICS

## 2025-03-11 PROCEDURE — 1036F TOBACCO NON-USER: CPT | Performed by: PEDIATRICS

## 2025-03-11 PROCEDURE — 1160F RVW MEDS BY RX/DR IN RCRD: CPT | Performed by: PEDIATRICS

## 2025-03-11 PROCEDURE — 99215 OFFICE O/P EST HI 40 MIN: CPT | Performed by: PEDIATRICS

## 2025-03-11 PROCEDURE — 1159F MED LIST DOCD IN RCRD: CPT | Performed by: PEDIATRICS

## 2025-03-11 ASSESSMENT — PATIENT HEALTH QUESTIONNAIRE - PHQ9
SUM OF ALL RESPONSES TO PHQ9 QUESTIONS 1 AND 2: 2
1. LITTLE INTEREST OR PLEASURE IN DOING THINGS: SEVERAL DAYS
2. FEELING DOWN, DEPRESSED OR HOPELESS: SEVERAL DAYS
10. IF YOU CHECKED OFF ANY PROBLEMS, HOW DIFFICULT HAVE THESE PROBLEMS MADE IT FOR YOU TO DO YOUR WORK, TAKE CARE OF THINGS AT HOME, OR GET ALONG WITH OTHER PEOPLE: NOT DIFFICULT AT ALL

## 2025-03-11 NOTE — PROGRESS NOTES
Subjective   Patient ID: Isidoro Amaya is a 80 y.o. male who presents for COPD, RASHAD    HPI    11/19/2019: 74-year-old  man with severe obstructive sleep apnea (AHI 53.9, 12/8/15) on C Pap 14 with [employer] FFM M/M, moderate COPD, and multiple calcified mucous plugs presents for routine follow-up. The patient has been using see Pap 5-6 hours every night. No snoring on C Pap. Feels rested with sleep and denies excessive daytime sleepiness with an [place] sleepiness score of 8/24. He has been compliant with C Pap based on download from his machine. However there is and excessive mask leak recorded at 95 L/m. I examined his mask and noted that it would leak when he lies on his side despite tightening the straps. Short of breath only when walking fast. Occasional cough. No wheezing. Not always compliant with Spiriva, only using it 3-4 times a week. Rarely takes Pro Air. No hospitalizations or ER visits for respiratory problems in one year.      08/20/2020: Patient is here today to establish care. He is doing well, uses his Spiriva daily. Rarely uses his rescue inhaler. Really only experiences shortness of breath in the heat and humidity. He does get his Brie via from [facility]. He is 100% compliant with his CPAP using it 6 hours a night AHI is 2.6. He feels rested with sleep. He does get supplies regularly from [facility]. He has had s  ome billing issues with [employer]. His wife is working out. No ER visits or hospitalizations for respiratory problems.     03/04/2021: He is here today for follow-up. Patient is doing great with his CPAP machine he uses it every night for 6 hours AHI is 4. He feels rested with sleep. He gets supplies regularly. He uses his Spiriva every day he still gets this from [facility]. He does have a cough with some loose congestion although when it is productive it is clear he denies fevers, chills. I suggested he try some Mucinex to help bring the cough up and thin out the mucus. He does use his Acapella  device daily. He has a history of mucus plugging.     09/09/2021: He is here today for follow-up. He has been feeling well he uses his Spiriva daily. Has not needed his rescue medication. Had a little trouble breathing when it was hot and humid but now that it is cooler he is doing much better. He does use his CPAP every night for about 6 hours. He gets supplies regularly. Feels rested with sleep. He cannot sleep without his CPAP. I encouraged him to stay active.     04/07/2022: He is here today for follow-up. He is doing well. He does have a little bit of a morning cough which sounds like postnasal drip. We talked about using some Claritin or Mucinex or even a nasal spray if he prefers. He does use his Spiriva although not every day. I gave him some samples of the Spiriva Respimat. He does get the Spiriva which is a capsule through [facility]. Has not needed his rescue inhaler at all. Uses his CPAP every night for 5 hours AHI is 3. Get supplies regularly. Feels rested with sleep.     10/20/22 he is here today for follow-up. He has had a postnasal drip congested cough for the last week or 2 since the weather changed. Encouraged him to use his Claritin his guaifenesin and I will add some Flonase. Currently he is not using any inhalers. [place] is out of Spiriva so he has not been able to get it. I did offer to send him in something else but he wants to wait. And he needs his ProAir refilled. He still uses his Acapella device and I encouraged him to continue to do that. He uses his CPAP every night for 5 hours his AHI is 2.3. He feels rested with sleep. He says 5 hours is his average every once while he will sleep 6.     04/20/23 he is here today for follow-up. He is doing well with his Spiriva. He gets it from [facility]. He has the HandiHaler. Uses his albuterol as needed. He did have bronchitis a couple weeks ago he was treated with doxycycline. He is using his CPAP every night for about 4 hours. He says he does not always  sleep that long because he has had a lot of medical issues over the last couple of months. I encouraged him to sleep longer or use his CPAP if he naps during the day 10 minutes spent preparing patient's chart. 20 minutes spent with patient answering questions and determining care. 5 minutes spent charting and ordering tests and medications     12/14/23 he is here today for follow-up.  He continues to use his Spiriva from [facility].  His PCP added Wixela which I explained to him today he can use the Wixela with the Spiriva and it makes triple therapy.  He is going to try it and see how it is working for him.  He is also never used his rescue inhaler he really felt that you just used it when it was a dire emergency.  We discussed utilizing his inhaler for shortness of breath coughing and wheezing as well as pretreating for something that he knows is going to cause some shortness of breath such as walking upstairs.  He wears the his CPAP machine every night.  He is averages 3.6 hours we talked today about how he needs to utilize it 70% of the time for 4 hours or greater.  He is going to work on this and come back in 2 months     2/22/24 he is here today for follow-up.  He is using his Spiriva and tells me he just started the Wixela.  He needs a Spiriva since he cannot.  Uses albuterol as needed.  He has a cough but it sounds postnasal drip.  His lungs are clear.  He is complaining today of a headache.  He tells me his blood sugars have been high since they changed his medication.  I recommend that he follow-up with his PCP and if the headache should worsen I suggested he go to the ED.  He does use his CPAP he puts it on every night but he only sleeps for about 3.5 hours.  We really talked today about utilizing the CPAP throughout the entire night.     09/05/24 he is here today for follow-up.  He is doing well.  He has developed a cough and is a wet sounding cough.  He has an appointment with Dr. Rosenberg but could not get in  "until December.  It sounds like postnasal drip he is also having some congestion in his sinuses.  I recommend that he start a nasal spray and use Claritin over the next few weeks to see if it resolves.    3/11/2025:  Mr Amaya is at baseline.  He uses spiriva \"when I remember\" and albuterol on occasion. He usually wears his CPAP every night but has not for the last 2 weeks due to \"something going on in the house\"      Review of Systems    See scanned documents attached to this note for review of systems, and appropriate scales/scores for this visit.    Objective   Physical Exam  Constitutional:       Appearance: Normal appearance.   HENT:      Head: Normocephalic and atraumatic.      Mouth/Throat:      Pharynx: Oropharynx is clear.   Cardiovascular:      Rate and Rhythm: Normal rate and regular rhythm.      Pulses: Normal pulses.      Heart sounds: Normal heart sounds.   Pulmonary:      Effort: Pulmonary effort is normal.      Breath sounds: Normal breath sounds. No wheezing, rhonchi or rales.   Abdominal:      General: Bowel sounds are normal.      Palpations: Abdomen is soft.   Musculoskeletal:         General: Normal range of motion.   Skin:     General: Skin is warm and dry.   Neurological:      General: No focal deficit present.      Mental Status: He is alert and oriented to person, place, and time.   Psychiatric:         Mood and Affect: Mood normal.         Assessment/Plan     # . Severe obstructive sleep apnea with good compliance and good control of sleep symptoms    - Continue with C Pap 13 and you ResMed air fit F20 medium fullface mask with headgear for at least 4 hours every night with sleep.    - Great job with your CPAP, he uses it every night for 6 hours and gets supplies regularly   04/20/23 he is using his CPAP for about 4 hours a night he said he has had a lot of medical problems over the last couple of months and sometimes he just does not sleep well  12/14/23 he is using his machine every " night. Will increase sleep usage by sleeping longer   2/22/24  He uses his CPAP every ngiht but only for about 3.5 hours.   09/05/24 His cpap is set at 13 he uses it every night for 4.5 hours Ahi is 1.5. he gets supplies and feels mostly rested  3/11/2025:  continue with CPAP  - set up appointment with DR Sorenson     #. Moderate COPD from secondhand smoke exposure, controlled but not always compliant with Spiriva (3-4 days)   - Advised to take Spiriva regularly one puff daily. Continue with Pro Air 4 times daily followed by an Acapella device to help expectorate mucous plugs. Pro Air may also be taken as needed for shortness of breath   - having some coughing productive of clear sputum, suggested he add mucinex daily    -He is doing well with Spiriva, he needs refills to [facility]   - [facility] has been out of Spiriva he does not have a maintenance inhaler and he is out of his ProAir. I will send in ProAir for him. I did offer him a substitute for Spiriva but he wants to wait  .04/20/23 he is using his Spiriva from [facility]. Uses his Ventolin as needed. He did have bronchitis a couple weeks ago but is feeling better  12/14/23 he continues spiriva and pcp added wixela. Will try rescue inhaler since he has been unsure as when to use this  2/22/24 He continues on sprivia but it out, needs script sent to [facility]. Uses proair   09/05/24 He continues on Spiriva and uses his albuterol as needed   3/11/2025: encouraged more consistent use of spiriva, albuterol as needed       # lung nodules   Ct 12/28/23 calcified nodules,  and multiple non calcified nodules including a 5mm subpleural nodule. Bronchiectatic changes. Never a smoker     Follow up me and DR Sorenson 6 months         Ron Plata MD 03/11/25 12:56 PM

## 2025-09-11 ENCOUNTER — APPOINTMENT (OUTPATIENT)
Dept: SLEEP MEDICINE | Facility: CLINIC | Age: 80
End: 2025-09-11
Payer: MEDICARE

## 2025-09-11 ENCOUNTER — APPOINTMENT (OUTPATIENT)
Dept: PULMONOLOGY | Facility: CLINIC | Age: 80
End: 2025-09-11
Payer: MEDICARE